# Patient Record
Sex: FEMALE | URBAN - METROPOLITAN AREA
[De-identification: names, ages, dates, MRNs, and addresses within clinical notes are randomized per-mention and may not be internally consistent; named-entity substitution may affect disease eponyms.]

---

## 2022-04-13 ENCOUNTER — OFFICE VISIT (OUTPATIENT)
Dept: URBAN - METROPOLITAN AREA CLINIC 7 | Facility: CLINIC | Age: 62
End: 2022-04-13
Payer: COMMERCIAL

## 2022-04-13 DIAGNOSIS — E11.3511 TYPE 2 DIABETES MELLITUS WITH PROLIFERATIVE DIABETIC RETINOPATHY WITH MACULAR EDEMA, RIGHT EYE: Primary | ICD-10-CM

## 2022-04-13 DIAGNOSIS — H25.13 AGE-RELATED NUCLEAR CATARACT, BILATERAL: ICD-10-CM

## 2022-04-13 DIAGNOSIS — E11.3412 TYPE 2 DIABETES MELLITUS WITH SEVERE NONPROLIFERATIVE DIABETIC RETINOPATHY WITH MACULAR EDEMA, LEFT EYE: ICD-10-CM

## 2022-04-13 DIAGNOSIS — H43.11 VITREOUS HEMORRHAGE, RIGHT EYE: ICD-10-CM

## 2022-04-13 PROCEDURE — 92134 CPTRZ OPH DX IMG PST SGM RTA: CPT | Performed by: OPHTHALMOLOGY

## 2022-04-13 PROCEDURE — 92250 FUNDUS PHOTOGRAPHY W/I&R: CPT | Performed by: OPHTHALMOLOGY

## 2022-04-13 PROCEDURE — 99204 OFFICE O/P NEW MOD 45 MIN: CPT | Performed by: OPHTHALMOLOGY

## 2022-04-13 PROCEDURE — 92235 FLUORESCEIN ANGRPH MLTIFRAME: CPT | Performed by: OPHTHALMOLOGY

## 2022-04-13 RX ORDER — METFORMIN HYDROCHLORIDE 1000 MG/1
TABLET, FILM COATED ORAL
Qty: 0 | Refills: 0 | Status: ACTIVE
Start: 2022-04-13

## 2022-04-13 NOTE — IMPRESSION/PLAN
Impression: PDR with DME OD Plan: Pt c/o new floaters OD. Exam/OCT show VH c/w active PDR with CSDME OD. Photos 4/13/22 showed VH. FA sweeps 4/13/22 showed angiographic leakage, no NV noted. D/w pt. Reviewed RBA of obs, Avastin, and laser; recommend Avastin OD today. 

1 month, OCT OU, reeval DR/DME

## 2022-04-13 NOTE — IMPRESSION/PLAN
Impression: severe NPDR with DME OS Plan: Exam/OCT show DBH/CWS c/w severe NPDR with CSDME OS. Photos 4/13/22 showed severe NPDR. FA sweeps 4/13/22 showed angiograhic leakage, areas of ischemia w/o NV. Reviewed options of observation, anti-VEGF, steroids, and laser; recommend Avastin OS today, followed by focal laser when appropriate. Cont BS/BP/chol control.

## 2022-05-11 ENCOUNTER — OFFICE VISIT (OUTPATIENT)
Dept: URBAN - METROPOLITAN AREA CLINIC 7 | Facility: CLINIC | Age: 62
End: 2022-05-11
Payer: COMMERCIAL

## 2022-05-11 DIAGNOSIS — H43.11 VITREOUS HEMORRHAGE, RIGHT EYE: ICD-10-CM

## 2022-05-11 DIAGNOSIS — E11.3511 TYPE 2 DIABETES MELLITUS WITH PROLIFERATIVE DIABETIC RETINOPATHY WITH MACULAR EDEMA, RIGHT EYE: Primary | ICD-10-CM

## 2022-05-11 PROCEDURE — 92134 CPTRZ OPH DX IMG PST SGM RTA: CPT | Performed by: OPHTHALMOLOGY

## 2022-05-11 PROCEDURE — 92012 INTRM OPH EXAM EST PATIENT: CPT | Performed by: OPHTHALMOLOGY

## 2022-05-11 ASSESSMENT — INTRAOCULAR PRESSURE
OD: 12
OS: 13

## 2022-05-11 NOTE — IMPRESSION/PLAN
Impression: PDR with DME OD Plan: Exam/OCT show improving VH with improving CSDME OD. Photos 4/13/22 showed VH. FA sweeps 4/13/22 showed angiographic leakage, no NV noted. Reviewed RBA of obs, Avastin, and laser; recommend Avastin OD today, attempt T+E; schedule for focal laser. 1-2 weeks, focal grid OS (superior macula, use 4/13 OCT); then 1-2 weeks, focal grid OD (ST, use 5/11 OCT) 8 weeks, OCT/Avastin OU

## 2022-05-11 NOTE — IMPRESSION/PLAN
Impression: severe NPDR with DME OS Plan: Exam/OCT show severe NPDR with resolved CSDME OS. Photos 4/13/22 showed severe NPDR. FA sweeps 4/13/22 showed angiograhic leakage, areas of ischemia w/o NV. Reviewed options of observation, anti-VEGF, steroids, and laser; recommend Avastin OS today, attempt T+E; schedule for focal laser. Cont BS/BP/chol control.

## 2022-05-25 ENCOUNTER — PROCEDURE (OUTPATIENT)
Dept: URBAN - METROPOLITAN AREA CLINIC 7 | Facility: CLINIC | Age: 62
End: 2022-05-25
Payer: COMMERCIAL

## 2022-05-25 DIAGNOSIS — E11.3412 TYPE 2 DIABETES MELLITUS WITH SEVERE NONPROLIFERATIVE DIABETIC RETINOPATHY WITH MACULAR EDEMA, LEFT EYE: Primary | ICD-10-CM

## 2022-05-25 PROCEDURE — 67210 TREATMENT OF RETINAL LESION: CPT | Performed by: OPHTHALMOLOGY

## 2022-05-25 ASSESSMENT — INTRAOCULAR PRESSURE
OD: 15
OS: 15

## 2022-06-08 ENCOUNTER — OFFICE VISIT (OUTPATIENT)
Dept: URBAN - METROPOLITAN AREA CLINIC 7 | Facility: CLINIC | Age: 62
End: 2022-06-08
Payer: COMMERCIAL

## 2022-06-08 DIAGNOSIS — E11.3511 TYPE 2 DIABETES MELLITUS WITH PROLIFERATIVE DIABETIC RETINOPATHY WITH MACULAR EDEMA, RIGHT EYE: Primary | ICD-10-CM

## 2022-06-08 PROCEDURE — 67210 TREATMENT OF RETINAL LESION: CPT | Performed by: OPHTHALMOLOGY

## 2022-06-08 ASSESSMENT — INTRAOCULAR PRESSURE
OD: 15
OS: 13

## 2022-07-06 ENCOUNTER — OFFICE VISIT (OUTPATIENT)
Dept: URBAN - METROPOLITAN AREA CLINIC 7 | Facility: CLINIC | Age: 62
End: 2022-07-06
Payer: COMMERCIAL

## 2022-07-06 DIAGNOSIS — E11.3511 TYPE 2 DIABETES MELLITUS WITH PROLIFERATIVE DIABETIC RETINOPATHY WITH MACULAR EDEMA, RIGHT EYE: Primary | ICD-10-CM

## 2022-07-06 DIAGNOSIS — E11.3412 TYPE 2 DIABETES MELLITUS WITH SEVERE NONPROLIFERATIVE DIABETIC RETINOPATHY WITH MACULAR EDEMA, LEFT EYE: ICD-10-CM

## 2022-07-06 PROCEDURE — 92134 CPTRZ OPH DX IMG PST SGM RTA: CPT | Performed by: OPHTHALMOLOGY

## 2022-07-06 ASSESSMENT — INTRAOCULAR PRESSURE
OD: 21
OS: 21

## 2022-09-14 ENCOUNTER — OFFICE VISIT (OUTPATIENT)
Dept: URBAN - METROPOLITAN AREA CLINIC 7 | Facility: CLINIC | Age: 62
End: 2022-09-14
Payer: COMMERCIAL

## 2022-09-14 DIAGNOSIS — H25.13 AGE-RELATED NUCLEAR CATARACT, BILATERAL: ICD-10-CM

## 2022-09-14 DIAGNOSIS — E11.3511 TYPE 2 DIABETES MELLITUS WITH PROLIFERATIVE DIABETIC RETINOPATHY WITH MACULAR EDEMA, RIGHT EYE: Primary | ICD-10-CM

## 2022-09-14 DIAGNOSIS — E11.3412 TYPE 2 DIABETES MELLITUS WITH SEVERE NONPROLIFERATIVE DIABETIC RETINOPATHY WITH MACULAR EDEMA, LEFT EYE: ICD-10-CM

## 2022-09-14 DIAGNOSIS — H43.11 VITREOUS HEMORRHAGE, RIGHT EYE: ICD-10-CM

## 2022-09-14 PROCEDURE — 92134 CPTRZ OPH DX IMG PST SGM RTA: CPT | Performed by: OPHTHALMOLOGY

## 2022-09-14 PROCEDURE — 99214 OFFICE O/P EST MOD 30 MIN: CPT | Performed by: OPHTHALMOLOGY

## 2022-09-14 PROCEDURE — 67028 INJECTION EYE DRUG: CPT | Performed by: OPHTHALMOLOGY

## 2022-09-14 ASSESSMENT — INTRAOCULAR PRESSURE
OS: 15
OD: 15

## 2022-09-14 NOTE — IMPRESSION/PLAN
Impression: severe NPDR with DME OS
 - s/p focal laser 5/25/22 Plan: Exam/OCT show severe NPDR with resolved CSDME OS. Photos 4/13/22 showed severe NPDR. FA sweeps 4/13/22 showed angiograhic leakage, areas of ischemia w/o NV. Reviewed options of observation, anti-VEGF, steroids, and laser; recommend Avastin OS today, cont T+E. Cont BS/BP/chol control.

## 2022-09-14 NOTE — IMPRESSION/PLAN
Impression: PDR with DME OD
 - s/p focal laser 6/8/22 Plan: Exam/OCT show improving VH with slightly worsening non-central CSDME OD. Photos 4/13/22 showed VH. FA sweeps 4/13/22 showed angiographic leakage, no NV noted. Reviewed RBA of obs, Avastin, and laser; recommend Avastin OD today, cont T+E.  Given worsening on OCT, submit Eylea BI.

12 weeks, photos/FA sweeps/OCT OU, Avastin OU

## 2022-12-07 ENCOUNTER — OFFICE VISIT (OUTPATIENT)
Dept: URBAN - METROPOLITAN AREA CLINIC 7 | Facility: CLINIC | Age: 62
End: 2022-12-07
Payer: COMMERCIAL

## 2022-12-07 DIAGNOSIS — E11.3511 TYPE 2 DIABETES MELLITUS WITH PROLIFERATIVE DIABETIC RETINOPATHY WITH MACULAR EDEMA, RIGHT EYE: Primary | ICD-10-CM

## 2022-12-07 DIAGNOSIS — H43.11 VITREOUS HEMORRHAGE, RIGHT EYE: ICD-10-CM

## 2022-12-07 DIAGNOSIS — E11.3412 TYPE 2 DIABETES MELLITUS WITH SEVERE NONPROLIFERATIVE DIABETIC RETINOPATHY WITH MACULAR EDEMA, LEFT EYE: ICD-10-CM

## 2022-12-07 PROCEDURE — 92134 CPTRZ OPH DX IMG PST SGM RTA: CPT | Performed by: OPHTHALMOLOGY

## 2022-12-07 PROCEDURE — 99213 OFFICE O/P EST LOW 20 MIN: CPT | Performed by: OPHTHALMOLOGY

## 2022-12-07 ASSESSMENT — INTRAOCULAR PRESSURE
OD: 15
OS: 10

## 2022-12-07 NOTE — IMPRESSION/PLAN
Impression: PDR with DME OD
 - s/p focal laser 6/8/22 Plan: Exam/OCT show improving VH with worsening non-central CSDME OD on MCA OCT. Photos 4/13/22 showed VH. FA sweeps 4/13/22 showed angiographic leakage, no NV noted. Reviewed RBA of obs, Avastin, and laser. Given worsening edema despite Avastin, recommend Eylea OD today, but pt's insurance has not given auth.  Proceed with Avastin OD today, resubmit Eylea BI.

12 weeks, photos/FA sweeps/OCT OU, Eylea OD, Avastin OS

## 2022-12-07 NOTE — IMPRESSION/PLAN
Impression: severe NPDR with DME OS
 - s/p focal laser 5/25/22 Plan: Exam/OCT show severe NPDR without recurrent CSDME OS. Photos 4/13/22 showed severe NPDR. FA sweeps 4/13/22 showed angiograhic leakage, areas of ischemia w/o NV. Reviewed options of observation, anti-VEGF, steroids, and laser; recommend q12 week maint Avastin OS today. Cont BS/BP/chol control.

## 2023-02-22 ENCOUNTER — OFFICE VISIT (OUTPATIENT)
Dept: URBAN - METROPOLITAN AREA CLINIC 7 | Facility: CLINIC | Age: 63
End: 2023-02-22
Payer: MEDICARE

## 2023-02-22 DIAGNOSIS — E11.3412 TYPE 2 DIABETES MELLITUS WITH SEVERE NONPROLIFERATIVE DIABETIC RETINOPATHY WITH MACULAR EDEMA, LEFT EYE: ICD-10-CM

## 2023-02-22 DIAGNOSIS — H25.13 AGE-RELATED NUCLEAR CATARACT, BILATERAL: ICD-10-CM

## 2023-02-22 DIAGNOSIS — E11.3511 TYPE 2 DIABETES MELLITUS WITH PROLIFERATIVE DIABETIC RETINOPATHY WITH MACULAR EDEMA, RIGHT EYE: Primary | ICD-10-CM

## 2023-02-22 DIAGNOSIS — H43.11 VITREOUS HEMORRHAGE, RIGHT EYE: ICD-10-CM

## 2023-02-22 PROCEDURE — 99214 OFFICE O/P EST MOD 30 MIN: CPT | Performed by: OPHTHALMOLOGY

## 2023-02-22 PROCEDURE — 92250 FUNDUS PHOTOGRAPHY W/I&R: CPT | Performed by: OPHTHALMOLOGY

## 2023-02-22 PROCEDURE — 92235 FLUORESCEIN ANGRPH MLTIFRAME: CPT | Performed by: OPHTHALMOLOGY

## 2023-02-22 PROCEDURE — 92134 CPTRZ OPH DX IMG PST SGM RTA: CPT | Performed by: OPHTHALMOLOGY

## 2023-02-22 ASSESSMENT — INTRAOCULAR PRESSURE
OS: 13
OD: 13

## 2023-02-22 NOTE — IMPRESSION/PLAN
Impression: PDR with DME OD
 - s/p focal laser 6/8/22 Plan: Exam/OCT show improving VH with worsening non-central CSDME OD. Photos 2/22/23 showed VH. FA sweeps 2/22/23 showed angiographic leakage, no NV noted. Reviewed RBA of obs, Eylea, and laser. Given worsening edema despite Avastin, recommend Eylea OD today. Schedule focal grid to add stability. 1-2 weeks, focal grid OS; then 1-2 weeks, focal grid OD (use 2/22/23 Heidel FA/OCT) 12 weeks, DFE/OCT OU, Eylea OU

## 2023-02-22 NOTE — IMPRESSION/PLAN
Impression: severe NPDR with DME OS
 - s/p focal laser 5/25/22 Plan: Exam/OCT show severe NPDR with recurrent non-central CSDME OS. Photos 2/22/23 showed severe NPDR. FA sweeps 2/22/23 showed angiograhic leakage, areas of ischemia w/o NV. Reviewed options of observation, anti-VEGF, steroids, and laser; given recurrence, recommend switch to Eylea OS today. Schedule focal grid to add stability, cont q12 week maint Tx for now. Cont BS/BP/chol control.

## 2023-03-08 ENCOUNTER — PROCEDURE (OUTPATIENT)
Dept: URBAN - METROPOLITAN AREA CLINIC 7 | Facility: CLINIC | Age: 63
End: 2023-03-08
Payer: MEDICARE

## 2023-03-08 DIAGNOSIS — E11.3412 TYPE 2 DIABETES MELLITUS WITH SEVERE NONPROLIFERATIVE DIABETIC RETINOPATHY WITH MACULAR EDEMA, LEFT EYE: Primary | ICD-10-CM

## 2023-03-08 PROCEDURE — 67210 TREATMENT OF RETINAL LESION: CPT | Performed by: OPHTHALMOLOGY

## 2023-03-08 ASSESSMENT — INTRAOCULAR PRESSURE
OD: 14
OS: 12

## 2023-03-22 ENCOUNTER — PROCEDURE (OUTPATIENT)
Dept: URBAN - METROPOLITAN AREA CLINIC 7 | Facility: CLINIC | Age: 63
End: 2023-03-22
Payer: MEDICARE

## 2023-03-22 DIAGNOSIS — E11.3511 TYPE 2 DIABETES MELLITUS WITH PROLIFERATIVE DIABETIC RETINOPATHY WITH MACULAR EDEMA, RIGHT EYE: Primary | ICD-10-CM

## 2023-03-22 PROCEDURE — 67210 TREATMENT OF RETINAL LESION: CPT | Performed by: OPHTHALMOLOGY

## 2023-03-22 ASSESSMENT — INTRAOCULAR PRESSURE
OS: 14
OD: 14

## 2023-05-31 ENCOUNTER — OFFICE VISIT (OUTPATIENT)
Dept: URBAN - METROPOLITAN AREA CLINIC 7 | Facility: CLINIC | Age: 63
End: 2023-05-31
Payer: MEDICARE

## 2023-05-31 DIAGNOSIS — H43.11 VITREOUS HEMORRHAGE, RIGHT EYE: ICD-10-CM

## 2023-05-31 DIAGNOSIS — E11.3511 TYPE 2 DIABETES MELLITUS WITH PROLIFERATIVE DIABETIC RETINOPATHY WITH MACULAR EDEMA, RIGHT EYE: Primary | ICD-10-CM

## 2023-05-31 DIAGNOSIS — E11.3412 TYPE 2 DIABETES MELLITUS WITH SEVERE NONPROLIFERATIVE DIABETIC RETINOPATHY WITH MACULAR EDEMA, LEFT EYE: ICD-10-CM

## 2023-05-31 PROCEDURE — 92012 INTRM OPH EXAM EST PATIENT: CPT | Performed by: OPHTHALMOLOGY

## 2023-05-31 PROCEDURE — 92134 CPTRZ OPH DX IMG PST SGM RTA: CPT | Performed by: OPHTHALMOLOGY

## 2023-05-31 ASSESSMENT — INTRAOCULAR PRESSURE
OD: 16
OS: 17

## 2023-05-31 NOTE — IMPRESSION/PLAN
Impression: severe NPDR with DME OS
 - s/p focal laser 5/25/22 and 3/8/23 Plan: Exam/OCT show severe NPDR with stable non-central CSDME OS. Photos 2/22/23 showed severe NPDR. FA sweeps 2/22/23 showed angiograhic leakage, areas of ischemia w/o NV. Reviewed options of observation, anti-VEGF, steroids, and laser; given recurrence, recommend q12 week Eylea OS today. Cont BS/BP/chol control.

## 2023-05-31 NOTE — IMPRESSION/PLAN
Impression: PDR with DME OD
 - s/p focal laser 6/8/22 and 3/22/23 Plan: Exam/OCT show improving VH with stable non-central CSDME OD. Photos 2/22/23 showed VH. FA sweeps 2/22/23 showed angiographic leakage, no NV noted. Reviewed RBA of obs, Eylea, and laser; recommend q12 week Eylea OD today.  

12 weeks, DFE/OCT OU, Eylea OU